# Patient Record
Sex: FEMALE | Race: BLACK OR AFRICAN AMERICAN | NOT HISPANIC OR LATINO | URBAN - METROPOLITAN AREA
[De-identification: names, ages, dates, MRNs, and addresses within clinical notes are randomized per-mention and may not be internally consistent; named-entity substitution may affect disease eponyms.]

---

## 2022-06-07 ENCOUNTER — OFFICE VISIT (OUTPATIENT)
Dept: URBAN - METROPOLITAN AREA CLINIC 113 | Facility: CLINIC | Age: 22
End: 2022-06-07

## 2022-09-06 ENCOUNTER — WEB ENCOUNTER (OUTPATIENT)
Dept: URBAN - METROPOLITAN AREA CLINIC 113 | Facility: CLINIC | Age: 22
End: 2022-09-06

## 2022-09-09 ENCOUNTER — OFFICE VISIT (OUTPATIENT)
Dept: URBAN - METROPOLITAN AREA CLINIC 113 | Facility: CLINIC | Age: 22
End: 2022-09-09
Payer: COMMERCIAL

## 2022-09-09 ENCOUNTER — LAB OUTSIDE AN ENCOUNTER (OUTPATIENT)
Dept: URBAN - METROPOLITAN AREA CLINIC 113 | Facility: CLINIC | Age: 22
End: 2022-09-09

## 2022-09-09 VITALS
RESPIRATION RATE: 18 BRPM | DIASTOLIC BLOOD PRESSURE: 78 MMHG | TEMPERATURE: 97.1 F | SYSTOLIC BLOOD PRESSURE: 114 MMHG | HEIGHT: 62 IN | WEIGHT: 133 LBS | HEART RATE: 81 BPM | BODY MASS INDEX: 24.48 KG/M2

## 2022-09-09 DIAGNOSIS — K51.911 ULCERATIVE COLITIS WITH RECTAL BLEEDING: ICD-10-CM

## 2022-09-09 DIAGNOSIS — K21.9 GERD WITHOUT ESOPHAGITIS: ICD-10-CM

## 2022-09-09 PROCEDURE — 99204 OFFICE O/P NEW MOD 45 MIN: CPT | Performed by: INTERNAL MEDICINE

## 2022-09-09 RX ORDER — OMEPRAZOLE 20 MG/1
1 CAPSULE 30 MINUTES BEFORE MORNING MEAL CAPSULE, DELAYED RELEASE ORAL ONCE A DAY
OUTPATIENT

## 2022-09-09 RX ORDER — OMEPRAZOLE 20 MG/1
1 CAPSULE 30 MINUTES BEFORE MORNING MEAL CAPSULE, DELAYED RELEASE ORAL ONCE A DAY
Status: ACTIVE | COMMUNITY

## 2022-09-09 RX ORDER — ADALIMUMAB 40MG/0.8ML
0.8 ML KIT SUBCUTANEOUS
OUTPATIENT

## 2022-09-09 RX ORDER — ADALIMUMAB 40MG/0.8ML
0.8 ML KIT SUBCUTANEOUS
Status: ACTIVE | COMMUNITY

## 2022-09-09 NOTE — HPI-TODAY'S VISIT:
Patient presents today for initial evaluation.  She was diagnosed with ulcerative pancolitis in 2016.  She has been on Humira for an extended period of time with weekly dosing regimen.  She states that currently she is doing well.  1 or 2 bowel movements per day without any bleeding.  Denies any urgency.  Denies any abdominal pain.  I reviewed records with her.  She looks like she had a flare of disease in February with an elevated calprotectin level.  Interestingly she had significant Humira antibodies at that time.  She was given a course of steroid taper.  She is not smoking and is a [employer].  She has reflux which is well controlled with omeprazole.  Father had colon cancer sounds like at a fairly young age.

## 2022-09-20 ENCOUNTER — OFFICE VISIT (OUTPATIENT)
Dept: URBAN - METROPOLITAN AREA SURGERY CENTER 25 | Facility: SURGERY CENTER | Age: 22
End: 2022-09-20
Payer: COMMERCIAL

## 2022-09-20 DIAGNOSIS — K63.5 OTHER POLYP OF COLON: ICD-10-CM

## 2022-09-20 DIAGNOSIS — K51.00 CHRONIC PANCOLONIC ULCERATIVE COLITIS: ICD-10-CM

## 2022-09-20 DIAGNOSIS — K51.30 CHRONIC ULCERATIVE RECTOSIGMOIDITIS: ICD-10-CM

## 2022-09-20 LAB
A/G RATIO: 1.2
ABSOLUTE BASOPHILS: 18
ABSOLUTE EOSINOPHILS: 110
ABSOLUTE LYMPHOCYTES: 1507
ABSOLUTE MONOCYTES: 531
ABSOLUTE NEUTROPHILS: 3935
ADALIMUMAB AB, IBD: 13
ADALIMUMAB AB, IBD: 13
ADALIMUMAB LEVEL, IBD: 10.9
ALBUMIN: 4.4
ALKALINE PHOSPHATASE: 59
ALT (SGPT): 12
AST (SGOT): 19
BASOPHILS: 0.3
BILIRUBIN, TOTAL: 0.6
BUN/CREATININE RATIO: (no result)
BUN: 11
C-REACTIVE PROTEIN, QUANT: 0.9
CALCIUM: 9.8
CARBON DIOXIDE, TOTAL: 28
CHLORIDE: 103
COMMENT: (no result)
CREATININE: 0.77
EGFR: 111
EOSINOPHILS: 1.8
GLOBULIN, TOTAL: 3.7
GLUCOSE: 73
HEMATOCRIT: 36.8
HEMOGLOBIN: 12.2
INTERPRETATION: (no result)
LYMPHOCYTES: 24.7
MCH: 31.5
MCHC: 33.2
MCV: 95.1
MONOCYTES: 8.7
MPV: 10.3
NEUTROPHILS: 64.5
PLATELET COUNT: 431
POTASSIUM: 3.9
PROTEIN, TOTAL: 8.1
RDW: 14.3
RED BLOOD CELL COUNT: 3.87
SODIUM: 139
WHITE BLOOD CELL COUNT: 6.1

## 2022-09-20 PROCEDURE — G8907 PT DOC NO EVENTS ON DISCHARG: HCPCS | Performed by: INTERNAL MEDICINE

## 2022-09-20 PROCEDURE — 45380 COLONOSCOPY AND BIOPSY: CPT | Performed by: INTERNAL MEDICINE

## 2022-09-20 RX ORDER — OMEPRAZOLE 20 MG/1
1 CAPSULE 30 MINUTES BEFORE MORNING MEAL CAPSULE, DELAYED RELEASE ORAL ONCE A DAY
Status: ACTIVE | COMMUNITY

## 2022-09-20 RX ORDER — ADALIMUMAB 40MG/0.8ML
0.8 ML KIT SUBCUTANEOUS
Status: ACTIVE | COMMUNITY

## 2022-09-28 PROBLEM — 52506002 CHRONIC ULCERATIVE RECTOSIGMOIDITIS: Status: ACTIVE | Noted: 2022-09-28

## 2022-09-28 PROBLEM — 442159003 CHRONIC ULCERATIVE PANCOLITIS: Status: ACTIVE | Noted: 2022-09-28

## 2022-11-30 ENCOUNTER — DASHBOARD ENCOUNTERS (OUTPATIENT)
Age: 22
End: 2022-11-30

## 2022-11-30 ENCOUNTER — OFFICE VISIT (OUTPATIENT)
Dept: URBAN - METROPOLITAN AREA CLINIC 107 | Facility: CLINIC | Age: 22
End: 2022-11-30
Payer: COMMERCIAL

## 2022-11-30 VITALS
BODY MASS INDEX: 24.29 KG/M2 | HEART RATE: 64 BPM | SYSTOLIC BLOOD PRESSURE: 103 MMHG | TEMPERATURE: 97.5 F | WEIGHT: 132 LBS | DIASTOLIC BLOOD PRESSURE: 68 MMHG | HEIGHT: 62 IN

## 2022-11-30 DIAGNOSIS — K51.911 ULCERATIVE COLITIS WITH RECTAL BLEEDING: ICD-10-CM

## 2022-11-30 DIAGNOSIS — K21.9 GERD WITHOUT ESOPHAGITIS: ICD-10-CM

## 2022-11-30 PROCEDURE — 99214 OFFICE O/P EST MOD 30 MIN: CPT | Performed by: INTERNAL MEDICINE

## 2022-11-30 RX ORDER — ADALIMUMAB 40MG/0.4ML
0.4 ML KIT SUBCUTANEOUS WEEKLY
OUTPATIENT

## 2022-11-30 RX ORDER — OMEPRAZOLE 20 MG/1
1 CAPSULE 30 MINUTES BEFORE MORNING MEAL CAPSULE, DELAYED RELEASE ORAL ONCE A DAY
OUTPATIENT

## 2022-11-30 RX ORDER — ADALIMUMAB 40MG/0.4ML
0.4 ML KIT SUBCUTANEOUS WEEKLY
Status: ACTIVE | COMMUNITY

## 2022-11-30 RX ORDER — OMEPRAZOLE 20 MG/1
1 CAPSULE 30 MINUTES BEFORE MORNING MEAL CAPSULE, DELAYED RELEASE ORAL ONCE A DAY
Status: ACTIVE | COMMUNITY

## 2022-11-30 NOTE — HPI-TODAY'S VISIT:
patient returns today in follow-up.  She is doing very well.  Colonoscopy results reviewed with her.  Minimal inflammation.  She remains on Humira.  No bleeding.  No increased urgency.  She denies any abdominal pain.

## 2022-12-13 ENCOUNTER — TELEPHONE ENCOUNTER (OUTPATIENT)
Dept: URBAN - METROPOLITAN AREA CLINIC 113 | Facility: CLINIC | Age: 22
End: 2022-12-13

## 2022-12-15 PROBLEM — 266435005: Status: ACTIVE | Noted: 2022-09-09

## 2023-05-05 ENCOUNTER — OFFICE VISIT (OUTPATIENT)
Dept: URBAN - METROPOLITAN AREA CLINIC 113 | Facility: CLINIC | Age: 23
End: 2023-05-05

## 2023-05-05 RX ORDER — ADALIMUMAB 40MG/0.4ML
0.4 ML KIT SUBCUTANEOUS WEEKLY
Status: ACTIVE | COMMUNITY

## 2023-05-05 RX ORDER — OMEPRAZOLE 20 MG/1
1 CAPSULE 30 MINUTES BEFORE MORNING MEAL CAPSULE, DELAYED RELEASE ORAL ONCE A DAY
Status: ACTIVE | COMMUNITY

## 2025-02-21 ENCOUNTER — OFFICE VISIT (OUTPATIENT)
Dept: URBAN - METROPOLITAN AREA CLINIC 113 | Facility: CLINIC | Age: 25
End: 2025-02-21
Payer: COMMERCIAL

## 2025-02-21 ENCOUNTER — LAB OUTSIDE AN ENCOUNTER (OUTPATIENT)
Dept: URBAN - METROPOLITAN AREA CLINIC 113 | Facility: CLINIC | Age: 25
End: 2025-02-21

## 2025-02-21 VITALS
DIASTOLIC BLOOD PRESSURE: 72 MMHG | HEART RATE: 63 BPM | SYSTOLIC BLOOD PRESSURE: 105 MMHG | TEMPERATURE: 97.9 F | WEIGHT: 156.8 LBS | HEIGHT: 62 IN | RESPIRATION RATE: 18 BRPM | BODY MASS INDEX: 28.85 KG/M2

## 2025-02-21 DIAGNOSIS — K51.311 ULCERATIVE RECTOSIGMOIDITIS WITH RECTAL BLEEDING: ICD-10-CM

## 2025-02-21 DIAGNOSIS — K62.5 BLOOD PER RECTUM: ICD-10-CM

## 2025-02-21 DIAGNOSIS — R19.7 CHRONIC DIARRHEA: ICD-10-CM

## 2025-02-21 DIAGNOSIS — R10.30 LOWER ABDOMINAL PAIN: ICD-10-CM

## 2025-02-21 PROBLEM — 41364008: Status: ACTIVE | Noted: 2025-02-21

## 2025-02-21 PROCEDURE — 99204 OFFICE O/P NEW MOD 45 MIN: CPT | Performed by: NURSE PRACTITIONER

## 2025-02-21 RX ORDER — SODIUM, POTASSIUM,MAG SULFATES 17.5-3.13G
354 ML SOLUTION, RECONSTITUTED, ORAL ORAL ONCE
Qty: 354 MILLILITER | Refills: 0 | OUTPATIENT
Start: 2025-02-21 | End: 2025-02-22

## 2025-02-21 RX ORDER — OMEPRAZOLE 20 MG/1
1 CAPSULE 30 MINUTES BEFORE MORNING MEAL CAPSULE, DELAYED RELEASE ORAL ONCE A DAY
Status: ACTIVE | COMMUNITY

## 2025-02-21 RX ORDER — ADALIMUMAB 40MG/0.4ML
0.4 ML KIT SUBCUTANEOUS WEEKLY
Status: ON HOLD | COMMUNITY

## 2025-02-21 NOTE — HPI-TODAY'S VISIT:
25 yo woman with a history of ulcerative colitis with red blood per rectum, previously on Humira, presenting for long interval follow up  for evaluation of abdominal pain.   Colonoscopy 9/20/22: Good bowel preparation. Pseudopolyps in the sigmoid, descending colon, transverse colon, ascending colon and cecum. Proctosigmoiditis with inflammation noted from the anus to the sigmoid colon. Otherwise normal exam. Pathology consistent with moderate chronic active ulcerative colitis.  She is no longer taking  Humira, stopping this about one year ago. She is not on any medication for UC. Her abdominal pain is located in the generalized lower abdomen. Pain can be cramping and stabbing. There does seem to improve with passing gas or bowel movements. She has 3 to 4 loose, watery stools per day without any nocturnal defecation. She notes red blood per rectum every other to every second BM. Her appetite is fair. She is not losing any weight unintentionally. There is no nausea or vomiting. She does admit some tenesmus.

## 2025-02-23 LAB
CAMPYLOBACTER GROUP: NOT DETECTED
NOROVIRUS GI/GII: NOT DETECTED
ROTAVIRUS A: NOT DETECTED
SALMONELLA SPECIES: NOT DETECTED
SHIGA TOXIN 1: NOT DETECTED
SHIGA TOXIN 2: NOT DETECTED
SHIGELLA SPECIES: NOT DETECTED
VIBRIO GROUP: NOT DETECTED
YERSINIA ENTEROCOLITICA: NOT DETECTED

## 2025-02-25 LAB
A/G RATIO: 1.3
ABSOLUTE BASOPHILS: 29
ABSOLUTE EOSINOPHILS: 278
ABSOLUTE LYMPHOCYTES: 1572
ABSOLUTE MONOCYTES: 713
ABSOLUTE NEUTROPHILS: 3207
ALBUMIN: 4.3
ALKALINE PHOSPHATASE: 72
ALT (SGPT): 11
AST (SGOT): 18
BASOPHILS: 0.5
BILIRUBIN, TOTAL: 0.4
BUN/CREATININE RATIO: (no result)
BUN: 10
C-REACTIVE PROTEIN, QUANT: 12.3
CALCIUM: 9.8
CARBON DIOXIDE, TOTAL: 27
CHLORIDE: 103
CREATININE: 0.6
EGFR: 128
EOSINOPHILS: 4.8
GLOBULIN, TOTAL: 3.4
GLUCOSE: 86
HEMATOCRIT: 37.9
HEMOGLOBIN: 12.3
LYMPHOCYTES: 27.1
MCH: 32.1
MCHC: 32.5
MCV: 99
MITOGEN-NIL: 2.61
MONOCYTES: 12.3
MPV: 10.9
NEUTROPHILS: 55.3
PLATELET COUNT: 433
POTASSIUM: 4.1
PROTEIN, TOTAL: 7.7
QUANTIFERON NIL VALUE: 0.02
QUANTIFERON TB1 AG VALUE: <0
QUANTIFERON TB2 AG VALUE: <0
QUANTIFERON-TB GOLD PLUS: NEGATIVE
RDW: 13
RED BLOOD CELL COUNT: 3.83
SODIUM: 140
WHITE BLOOD CELL COUNT: 5.8

## 2025-02-28 LAB
CALPROTECTIN, FECAL: 2980
CLOSTRIDIUM DIFFICILE: (no result)

## 2025-03-06 ENCOUNTER — CLAIMS CREATED FROM THE CLAIM WINDOW (OUTPATIENT)
Dept: URBAN - METROPOLITAN AREA SURGERY CENTER 25 | Facility: SURGERY CENTER | Age: 25
End: 2025-03-06
Payer: COMMERCIAL

## 2025-03-06 ENCOUNTER — CLAIMS CREATED FROM THE CLAIM WINDOW (OUTPATIENT)
Dept: URBAN - METROPOLITAN AREA CLINIC 4 | Facility: CLINIC | Age: 25
End: 2025-03-06
Payer: COMMERCIAL

## 2025-03-06 DIAGNOSIS — K51.50 LEFT SIDED COLITIS WITHOUT COMPLICATIONS: ICD-10-CM

## 2025-03-06 DIAGNOSIS — K63.89 OTHER SPECIFIED DISEASES OF INTESTINE: ICD-10-CM

## 2025-03-06 DIAGNOSIS — K51.50 LEFT SIDED ULCERATIVE COLITIS WITHOUT COMPLICATION: ICD-10-CM

## 2025-03-06 DIAGNOSIS — K51.919 ULCERATIVE COLITIS, UNSPECIFIED WITH UNSPECIFIED COMPLICATIONS: ICD-10-CM

## 2025-03-06 DIAGNOSIS — K51.30: ICD-10-CM

## 2025-03-06 DIAGNOSIS — K51.30 ULCERATIVE (CHRONIC) RECTOSIGMOIDITIS WITHOUT COMPLICATIONS: ICD-10-CM

## 2025-03-06 PROCEDURE — 88342 IMHCHEM/IMCYTCHM 1ST ANTB: CPT | Performed by: PATHOLOGY

## 2025-03-06 PROCEDURE — 00811 ANES LWR INTST NDSC NOS: CPT | Performed by: ANESTHESIOLOGY

## 2025-03-06 PROCEDURE — 45380 COLONOSCOPY AND BIOPSY: CPT | Performed by: INTERNAL MEDICINE

## 2025-03-06 PROCEDURE — 88341 IMHCHEM/IMCYTCHM EA ADD ANTB: CPT | Performed by: PATHOLOGY

## 2025-03-06 PROCEDURE — 88305 TISSUE EXAM BY PATHOLOGIST: CPT | Performed by: PATHOLOGY

## 2025-03-06 PROCEDURE — 00811 ANES LWR INTST NDSC NOS: CPT | Performed by: ANESTHESIOLOGIST ASSISTANT

## 2025-03-06 RX ORDER — ADALIMUMAB 40MG/0.4ML
0.4 ML KIT SUBCUTANEOUS WEEKLY
Status: ON HOLD | COMMUNITY

## 2025-03-06 RX ORDER — OMEPRAZOLE 20 MG/1
1 CAPSULE 30 MINUTES BEFORE MORNING MEAL CAPSULE, DELAYED RELEASE ORAL ONCE A DAY
Status: ACTIVE | COMMUNITY

## 2025-03-27 ENCOUNTER — LAB OUTSIDE AN ENCOUNTER (OUTPATIENT)
Dept: URBAN - METROPOLITAN AREA CLINIC 113 | Facility: CLINIC | Age: 25
End: 2025-03-27

## 2025-03-27 ENCOUNTER — OFFICE VISIT (OUTPATIENT)
Dept: URBAN - METROPOLITAN AREA CLINIC 113 | Facility: CLINIC | Age: 25
End: 2025-03-27
Payer: COMMERCIAL

## 2025-03-27 ENCOUNTER — TELEPHONE ENCOUNTER (OUTPATIENT)
Dept: URBAN - METROPOLITAN AREA CLINIC 23 | Facility: CLINIC | Age: 25
End: 2025-03-27

## 2025-03-27 DIAGNOSIS — K51.911 ULCERATIVE COLITIS WITH RECTAL BLEEDING: ICD-10-CM

## 2025-03-27 DIAGNOSIS — K76.9 LIVER LESION: ICD-10-CM

## 2025-03-27 PROBLEM — 300331000: Status: ACTIVE | Noted: 2025-03-27

## 2025-03-27 PROCEDURE — 99214 OFFICE O/P EST MOD 30 MIN: CPT | Performed by: NURSE PRACTITIONER

## 2025-03-27 RX ORDER — RISANKIZUMAB-RZAA 60 MG/ML
AS DIRECTED INJECTION INTRAVENOUS
Qty: 1200 | Refills: 3 | OUTPATIENT
Start: 2025-03-27 | End: 2025-03-31

## 2025-03-27 RX ORDER — RISANKIZUMAB-RZAA 360 MG/2.4
AT WEEK 12 WEARABLE INJECTOR SUBCUTANEOUS
Qty: 360 | Refills: 0 | OUTPATIENT
Start: 2025-03-27

## 2025-03-27 RX ORDER — ADALIMUMAB 40MG/0.4ML
0.4 ML KIT SUBCUTANEOUS WEEKLY
Status: ON HOLD | COMMUNITY

## 2025-03-27 RX ORDER — RISANKIZUMAB-RZAA 60 MG/ML
INJECTION INTRAVENOUS
OUTPATIENT
Start: 2025-03-27

## 2025-03-27 RX ORDER — RISANKIZUMAB-RZAA 360 MG/2.4
AT WEEK 12 WEARABLE INJECTOR SUBCUTANEOUS
Refills: 7 | OUTPATIENT
Start: 2025-03-27 | End: 2025-04-04

## 2025-03-27 RX ORDER — OMEPRAZOLE 20 MG/1
1 CAPSULE 30 MINUTES BEFORE MORNING MEAL CAPSULE, DELAYED RELEASE ORAL ONCE A DAY
Status: ACTIVE | COMMUNITY

## 2025-03-27 NOTE — HPI-TODAY'S VISIT:
24-year-old woman with a history of GERD, ulcerative rectosigmoiditis presenting for follow-up after a colonoscopy.  She was seen in the office 2/21/2025 for complaints of lower abdominal pain, diarrhea and blood per rectum.  Previously she was on Humira, however has been off of therapy for the last year.  Labs 2/21/2025 demonstrated an elevated CRP at 12.3, fecal calprotectin 2980, negative QuantiFERON-TB gold, normal CMP, normal CBC, negative stool gastrointestinal pathogen panel and negative C. difficile.  Colonoscopy 3/6/2025 was notable for a normal rectal examination, left-sided ulcerative colitis characterized as mild to moderate in severity with inflammation found from the anus to the descending colon.  Biopsies obtained.  Normal mucosa in the cecum, ascending colon and transverse colon status post biopsies.  Normal examined ileum.  Left-sided colon biopsies revealed diffuse chronic active colitis consistent with UC.  Right colon biopsies were without any abnormality.  She is not on any medications for her UC. She has bowel movements 3 to 4 times daily. There is no nocturnal defecation. She is not having any blood per rectum. There is no abdominal pain or cramping. No nausea or vomiting. No weight loss. Appetite is good. She denies any trouble with swallowing.  She was seen in the ED at [facility]/25/25 for complaints of persistent blood per rectum and diarrhea. She underwent CTa/p with contrast that was notable for a nonspecific ehnancing, hyperdense infiltrative mass in the right liver measuring up tp 3 x 3.5 cm. A MRI abdomen w/ contrast was recommended.

## 2025-04-03 ENCOUNTER — TELEPHONE ENCOUNTER (OUTPATIENT)
Dept: URBAN - METROPOLITAN AREA CLINIC 113 | Facility: CLINIC | Age: 25
End: 2025-04-03

## 2025-04-10 ENCOUNTER — OFFICE VISIT (OUTPATIENT)
Dept: URBAN - METROPOLITAN AREA CLINIC 112 | Facility: CLINIC | Age: 25
End: 2025-04-10
Payer: COMMERCIAL

## 2025-04-10 DIAGNOSIS — K51.911 ULCERATIVE COLITIS WITH RECTAL BLEEDING: ICD-10-CM

## 2025-04-10 PROCEDURE — 96415 CHEMO IV INFUSION ADDL HR: CPT | Performed by: INTERNAL MEDICINE

## 2025-04-10 PROCEDURE — 96413 CHEMO IV INFUSION 1 HR: CPT | Performed by: INTERNAL MEDICINE

## 2025-04-10 RX ORDER — ADALIMUMAB 40MG/0.4ML
0.4 ML KIT SUBCUTANEOUS WEEKLY
Status: ON HOLD | COMMUNITY

## 2025-04-10 RX ORDER — RISANKIZUMAB-RZAA 360 MG/2.4
AT WEEK 12 WEARABLE INJECTOR SUBCUTANEOUS
Qty: 360 | Refills: 0 | Status: ACTIVE | COMMUNITY
Start: 2025-03-27

## 2025-04-10 RX ORDER — OMEPRAZOLE 20 MG/1
1 CAPSULE 30 MINUTES BEFORE MORNING MEAL CAPSULE, DELAYED RELEASE ORAL ONCE A DAY
Status: ACTIVE | COMMUNITY

## 2025-04-10 RX ORDER — RISANKIZUMAB-RZAA 60 MG/ML
INJECTION INTRAVENOUS
Status: ACTIVE | COMMUNITY
Start: 2025-03-27

## 2025-04-23 ENCOUNTER — TELEPHONE ENCOUNTER (OUTPATIENT)
Dept: URBAN - METROPOLITAN AREA CLINIC 113 | Facility: CLINIC | Age: 25
End: 2025-04-23

## 2025-05-08 ENCOUNTER — TELEPHONE ENCOUNTER (OUTPATIENT)
Dept: URBAN - METROPOLITAN AREA CLINIC 113 | Facility: CLINIC | Age: 25
End: 2025-05-08

## 2025-05-08 ENCOUNTER — OFFICE VISIT (OUTPATIENT)
Dept: URBAN - METROPOLITAN AREA CLINIC 112 | Facility: CLINIC | Age: 25
End: 2025-05-08
Payer: COMMERCIAL

## 2025-05-08 DIAGNOSIS — K51.911 ULCERATIVE COLITIS WITH RECTAL BLEEDING: ICD-10-CM

## 2025-05-08 PROCEDURE — 96413 CHEMO IV INFUSION 1 HR: CPT | Performed by: INTERNAL MEDICINE

## 2025-05-08 PROCEDURE — 96415 CHEMO IV INFUSION ADDL HR: CPT | Performed by: INTERNAL MEDICINE

## 2025-05-08 RX ORDER — ADALIMUMAB 40MG/0.4ML
0.4 ML KIT SUBCUTANEOUS WEEKLY
Status: ON HOLD | COMMUNITY

## 2025-05-08 RX ORDER — RISANKIZUMAB-RZAA 60 MG/ML
INJECTION INTRAVENOUS
Status: ACTIVE | COMMUNITY
Start: 2025-03-27

## 2025-05-08 RX ORDER — OMEPRAZOLE 20 MG/1
1 CAPSULE 30 MINUTES BEFORE MORNING MEAL CAPSULE, DELAYED RELEASE ORAL ONCE A DAY
Status: ACTIVE | COMMUNITY

## 2025-05-08 RX ORDER — RISANKIZUMAB-RZAA 360 MG/2.4
AT WEEK 12 WEARABLE INJECTOR SUBCUTANEOUS
Qty: 360 | Refills: 0 | Status: ACTIVE | COMMUNITY
Start: 2025-03-27

## 2025-05-08 RX ORDER — ACETAMINOPHEN 325 MG/1
2 TABLETS TABLET, FILM COATED ORAL
Qty: 2 | Refills: 11 | OUTPATIENT
Start: 2025-05-08 | End: 2027-04-28

## 2025-05-27 ENCOUNTER — OFFICE VISIT (OUTPATIENT)
Dept: URBAN - METROPOLITAN AREA CLINIC 113 | Facility: CLINIC | Age: 25
End: 2025-05-27

## 2025-06-05 ENCOUNTER — OFFICE VISIT (OUTPATIENT)
Dept: URBAN - METROPOLITAN AREA CLINIC 112 | Facility: CLINIC | Age: 25
End: 2025-06-05
Payer: COMMERCIAL

## 2025-06-05 ENCOUNTER — TELEPHONE ENCOUNTER (OUTPATIENT)
Dept: URBAN - METROPOLITAN AREA CLINIC 113 | Facility: CLINIC | Age: 25
End: 2025-06-05

## 2025-06-05 DIAGNOSIS — K51.911 ULCERATIVE COLITIS WITH RECTAL BLEEDING: ICD-10-CM

## 2025-06-05 PROCEDURE — 96415 CHEMO IV INFUSION ADDL HR: CPT | Performed by: INTERNAL MEDICINE

## 2025-06-05 PROCEDURE — 96413 CHEMO IV INFUSION 1 HR: CPT | Performed by: INTERNAL MEDICINE

## 2025-06-05 RX ORDER — OMEPRAZOLE 20 MG/1
1 CAPSULE 30 MINUTES BEFORE MORNING MEAL CAPSULE, DELAYED RELEASE ORAL ONCE A DAY
Status: ACTIVE | COMMUNITY

## 2025-06-05 RX ORDER — ADALIMUMAB 40MG/0.4ML
0.4 ML KIT SUBCUTANEOUS WEEKLY
Status: ON HOLD | COMMUNITY

## 2025-06-05 RX ORDER — RISANKIZUMAB-RZAA 360 MG/2.4
AT WEEK 12 WEARABLE INJECTOR SUBCUTANEOUS
Qty: 360 | Refills: 0 | Status: ACTIVE | COMMUNITY
Start: 2025-03-27

## 2025-06-05 RX ORDER — ACETAMINOPHEN 325 MG/1
2 TABLETS TABLET, FILM COATED ORAL
Qty: 2 | Refills: 11 | Status: ACTIVE | COMMUNITY
Start: 2025-05-08 | End: 2027-04-28

## 2025-06-05 RX ORDER — RISANKIZUMAB-RZAA 60 MG/ML
INJECTION INTRAVENOUS
Status: ACTIVE | COMMUNITY
Start: 2025-03-27

## 2025-06-12 ENCOUNTER — OFFICE VISIT (OUTPATIENT)
Dept: URBAN - METROPOLITAN AREA CLINIC 113 | Facility: CLINIC | Age: 25
End: 2025-06-12
Payer: COMMERCIAL

## 2025-06-12 DIAGNOSIS — K51.911 ULCERATIVE COLITIS WITH RECTAL BLEEDING: ICD-10-CM

## 2025-06-12 PROCEDURE — 99213 OFFICE O/P EST LOW 20 MIN: CPT | Performed by: NURSE PRACTITIONER

## 2025-06-12 RX ORDER — RISANKIZUMAB-RZAA 360 MG/2.4
AT WEEK 12 WEARABLE INJECTOR SUBCUTANEOUS
Qty: 360 | Refills: 0 | Status: ACTIVE | COMMUNITY
Start: 2025-03-27

## 2025-06-12 RX ORDER — ACETAMINOPHEN 325 MG/1
2 TABLETS TABLET, FILM COATED ORAL
Qty: 2 | Refills: 11 | Status: ACTIVE | COMMUNITY
Start: 2025-05-08 | End: 2027-04-28

## 2025-06-12 RX ORDER — RISANKIZUMAB-RZAA 60 MG/ML
INJECTION INTRAVENOUS
Status: ACTIVE | COMMUNITY
Start: 2025-03-27

## 2025-06-12 RX ORDER — OMEPRAZOLE 20 MG/1
1 CAPSULE 30 MINUTES BEFORE MORNING MEAL CAPSULE, DELAYED RELEASE ORAL ONCE A DAY
Status: ACTIVE | COMMUNITY

## 2025-06-12 RX ORDER — ADALIMUMAB 40MG/0.4ML
0.4 ML KIT SUBCUTANEOUS WEEKLY
Status: ON HOLD | COMMUNITY

## 2025-06-12 NOTE — HPI-TODAY'S VISIT:
23 yo woman with a history of ulcerative colitis and liver lesion, presenting for follow up.  She was seen in the office in March 2025 following a colonoscopy notable for mild to moderate in severity on recent colonoscopy, presenting for follow up after starting Skyrizi.  She did have a headache after one of her induction infusions. She was instructed to take Tylenol by the nurse ambassador for Skyrizi. She was premedicated with Tylenol prior to her last infusion. She is due to start injections on 7/3/25. She is due for repeat LFTs.  She has been doing well on Skyrizi. She has bowel movements twice daily without any blood per rectum. She denies any tenesmus or nocturnal defecation. No abdominal pain, nausea or vomiting. No weight loss or change in appetite. No heartburn or trouble with swallowing.  MRI for history of liver lesion on 4/10/25 revealed a benign adenoma, for which a repeat MRI is recommended in 1 year to assess for stability.

## 2025-06-13 LAB
A/G RATIO: 1.3
ALBUMIN: 4.5
ALKALINE PHOSPHATASE: 59
ALT (SGPT): 15
AST (SGOT): 21
BILIRUBIN, TOTAL: 0.4
BUN/CREATININE RATIO: (no result)
BUN: 10
CALCIUM: 9.6
CARBON DIOXIDE, TOTAL: 30
CHLORIDE: 102
CREATININE: 0.67
EGFR: 124
GLOBULIN, TOTAL: 3.5
GLUCOSE: 86
POTASSIUM: 3.9
PROTEIN, TOTAL: 8
SODIUM: 139

## 2025-06-23 ENCOUNTER — TELEPHONE ENCOUNTER (OUTPATIENT)
Dept: URBAN - METROPOLITAN AREA CLINIC 113 | Facility: CLINIC | Age: 25
End: 2025-06-23

## 2025-07-14 ENCOUNTER — TELEPHONE ENCOUNTER (OUTPATIENT)
Dept: URBAN - METROPOLITAN AREA CLINIC 113 | Facility: CLINIC | Age: 25
End: 2025-07-14

## 2025-07-28 ENCOUNTER — OFFICE VISIT (OUTPATIENT)
Dept: URBAN - METROPOLITAN AREA CLINIC 107 | Facility: CLINIC | Age: 25
End: 2025-07-28

## 2025-07-28 RX ORDER — RISANKIZUMAB-RZAA 60 MG/ML
INJECTION INTRAVENOUS
Status: ACTIVE | COMMUNITY
Start: 2025-03-27

## 2025-07-28 RX ORDER — ADALIMUMAB 40MG/0.4ML
0.4 ML KIT SUBCUTANEOUS WEEKLY
Status: DISCONTINUED | COMMUNITY

## 2025-07-28 RX ORDER — OMEPRAZOLE 20 MG/1
1 CAPSULE 30 MINUTES BEFORE MORNING MEAL CAPSULE, DELAYED RELEASE ORAL ONCE A DAY
Status: ACTIVE | COMMUNITY

## 2025-07-28 RX ORDER — RISANKIZUMAB-RZAA 360 MG/2.4
AT WEEK 12 WEARABLE INJECTOR SUBCUTANEOUS
Qty: 360 | Refills: 0 | Status: ACTIVE | COMMUNITY
Start: 2025-03-27

## 2025-07-28 RX ORDER — ACETAMINOPHEN 325 MG/1
2 TABLETS TABLET, FILM COATED ORAL
Qty: 2 | Refills: 11 | Status: ACTIVE | COMMUNITY
Start: 2025-05-08 | End: 2027-04-28

## 2025-08-27 ENCOUNTER — OFFICE VISIT (OUTPATIENT)
Dept: URBAN - METROPOLITAN AREA CLINIC 113 | Facility: CLINIC | Age: 25
End: 2025-08-27
Payer: COMMERCIAL

## 2025-08-27 DIAGNOSIS — K51.911 ULCERATIVE COLITIS WITH RECTAL BLEEDING: ICD-10-CM

## 2025-08-27 PROCEDURE — 99213 OFFICE O/P EST LOW 20 MIN: CPT | Performed by: NURSE PRACTITIONER

## 2025-08-27 RX ORDER — OMEPRAZOLE 20 MG/1
1 CAPSULE 30 MINUTES BEFORE MORNING MEAL CAPSULE, DELAYED RELEASE ORAL ONCE A DAY
Status: ACTIVE | COMMUNITY

## 2025-08-27 RX ORDER — RISANKIZUMAB-RZAA 60 MG/ML
INJECTION INTRAVENOUS
Status: ACTIVE | COMMUNITY
Start: 2025-03-27

## 2025-08-27 RX ORDER — RISANKIZUMAB-RZAA 360 MG/2.4
AT WEEK 12 WEARABLE INJECTOR SUBCUTANEOUS
Qty: 360 | Refills: 0 | Status: ACTIVE | COMMUNITY
Start: 2025-03-27

## 2025-08-27 RX ORDER — ACETAMINOPHEN 325 MG/1
2 TABLETS TABLET, FILM COATED ORAL
Qty: 2 | Refills: 11 | Status: ACTIVE | COMMUNITY
Start: 2025-05-08 | End: 2027-04-28

## 2025-08-28 LAB
A/G RATIO: 1.4
ABSOLUTE BASOPHILS: 20
ABSOLUTE EOSINOPHILS: 40
ABSOLUTE LYMPHOCYTES: 988
ABSOLUTE MONOCYTES: 556
ABSOLUTE NEUTROPHILS: 2396
ALBUMIN: 4.3
ALKALINE PHOSPHATASE: 53
ALT (SGPT): 14
AST (SGOT): 20
BASOPHILS: 0.5
BILIRUBIN, TOTAL: 0.5
BUN/CREATININE RATIO: (no result)
BUN: 12
C-REACTIVE PROTEIN, QUANT: 4.1
CALCIUM: 9.8
CARBON DIOXIDE, TOTAL: 27
CHLORIDE: 104
CREATININE: 0.54
EGFR: 130
EOSINOPHILS: 1
GLOBULIN, TOTAL: 3.1
GLUCOSE: 84
HEMATOCRIT: 36.9
HEMOGLOBIN: 12
LYMPHOCYTES: 24.7
MCH: 32.3
MCHC: 32.5
MCV: 99.5
MONOCYTES: 13.9
MPV: 10.8
NEUTROPHILS: 59.9
PLATELET COUNT: 376
POTASSIUM: 3.8
PROTEIN, TOTAL: 7.4
RDW: 13.5
RED BLOOD CELL COUNT: 3.71
SODIUM: 138
WHITE BLOOD CELL COUNT: 4